# Patient Record
Sex: FEMALE | Race: WHITE
[De-identification: names, ages, dates, MRNs, and addresses within clinical notes are randomized per-mention and may not be internally consistent; named-entity substitution may affect disease eponyms.]

---

## 2018-02-16 ENCOUNTER — HOSPITAL ENCOUNTER (EMERGENCY)
Dept: HOSPITAL 76 - ED | Age: 33
Discharge: HOME | End: 2018-02-16
Payer: COMMERCIAL

## 2018-02-16 VITALS — DIASTOLIC BLOOD PRESSURE: 68 MMHG | SYSTOLIC BLOOD PRESSURE: 115 MMHG

## 2018-02-16 VITALS — SYSTOLIC BLOOD PRESSURE: 131 MMHG | DIASTOLIC BLOOD PRESSURE: 70 MMHG

## 2018-02-16 DIAGNOSIS — O99.89: Primary | ICD-10-CM

## 2018-02-16 DIAGNOSIS — R33.9: ICD-10-CM

## 2018-02-16 DIAGNOSIS — R33.9: Primary | ICD-10-CM

## 2018-02-16 DIAGNOSIS — Z3A.12: ICD-10-CM

## 2018-02-16 LAB
CLARITY UR REFRACT.AUTO: CLEAR
GLUCOSE UR QL STRIP.AUTO: NEGATIVE MG/DL
KETONES UR QL STRIP.AUTO: NEGATIVE MG/DL
NITRITE UR QL STRIP.AUTO: NEGATIVE
PH UR STRIP.AUTO: 6.5 PH (ref 5–7.5)
PROT UR STRIP.AUTO-MCNC: NEGATIVE MG/DL
RBC # UR STRIP.AUTO: (no result) /UL
RBC # URNS HPF: (no result) /HPF (ref 0–5)
SP GR UR STRIP.AUTO: <=1.005 (ref 1–1.03)
SQUAMOUS URNS QL MICRO: (no result)
UROBILINOGEN UR QL STRIP.AUTO: (no result) E.U./DL
UROBILINOGEN UR STRIP.AUTO-MCNC: NEGATIVE MG/DL

## 2018-02-16 PROCEDURE — 99283 EMERGENCY DEPT VISIT LOW MDM: CPT

## 2018-02-16 PROCEDURE — 51702 INSERT TEMP BLADDER CATH: CPT

## 2018-02-16 PROCEDURE — 81003 URINALYSIS AUTO W/O SCOPE: CPT

## 2018-02-16 PROCEDURE — 51701 INSERT BLADDER CATHETER: CPT

## 2018-02-16 PROCEDURE — 81001 URINALYSIS AUTO W/SCOPE: CPT

## 2018-02-16 PROCEDURE — 87086 URINE CULTURE/COLONY COUNT: CPT

## 2018-02-16 NOTE — ED PHYSICIAN DOCUMENTATION
PD HPI FEMALE 





- Stated complaint


Stated Complaint: FEMALE /12 WKS PREG





- Chief complaint


Chief Complaint: Abd Pain





- History obtained from


History obtained from: Patient





- History of Present Illness


Timing - onset: Today (several hours)


Timing - duration: Hours


Timing - details: Gradual onset


Pain level max: 10


Pain level max: 10


Associated symptoms: Back pain, Vaginal pain, Dysuria, Urinary frequency


Contributing factors: Pregnant (12 weeks pregnant; had US at 10 weeks 5 days 

which showed normal IUP)


OB-GYN History: G (3), P (2)


Recently seen: Not recently seen





Review of Systems


Constitutional: denies: Fever, Chills, Sweats


GI: denies: Abdominal Pain, Nausea, Vomiting


: reports: Dysuria, Frequency, Now pregnant EGA (12 weeks)





PD PAST MEDICAL HISTORY





- Past Medical History


Past Medical History: No





- Past Surgical History


Past Surgical History: No





- Allergies


Allergies/Adverse Reactions: 


 Allergies











Allergy/AdvReac Type Severity Reaction Status Date / Time


 


No Known Drug Allergies Allergy   Verified 02/16/18 02:05














- Social History


Does the pt smoke?: No


Smoking Status: Never smoker


Does the pt drink ETOH?: No


Does the pt have substance abuse?: No





- Immunizations


Immunizations are current?: Yes





PD ED PE NORMAL





- Vitals


Vital signs reviewed: Yes





- General


General: Alert and oriented X 3, Well developed/nourished, Other (appears to be 

in painful distress)





- Abdomen


Abdomen: Soft, Other (suprapubic tenderness and distention)





- Back


Back: No CVA TTP





- Derm


Derm: Normal color, Warm and dry





Results





- Vitals


Vitals: 


 Vital Signs - 24 hr











  02/16/18 02/16/18





  02:05 04:00


 


Temperature 36.5 C 


 


Heart Rate 114 H 65


 


Respiratory 20 16





Rate  


 


Blood Pressure 119/66 115/68


 


O2 Saturation 100 95








 Oxygen











O2 Source                      Room air

















- Labs


Labs: 


 Laboratory Tests











  02/16/18





  02:55


 


Urine Color  YELLOW


 


Urine Clarity  CLEAR


 


Urine pH  6.5


 


Ur Specific Gravity  <=1.005


 


Urine Protein  NEGATIVE


 


Urine Glucose (UA)  NEGATIVE


 


Urine Ketones  NEGATIVE


 


Urine Occult Blood  SMALL H


 


Urine Nitrite  NEGATIVE


 


Urine Bilirubin  NEGATIVE


 


Urine Urobilinogen  0.2 (NORMAL)


 


Ur Leukocyte Esterase  NEGATIVE


 


Urine RBC  0-5


 


Urine WBC  0-3


 


Ur Squamous Epith Cells  MOD Squamous H


 


Urine Bacteria  Rare


 


Ur Microscopic Review  INDICATED


 


Urine Culture Comments  NOT INDICATED














PD MEDICAL DECISION MAKING





- ED course


Complexity details: reviewed results, re-evaluated patient, considered 

differential, d/w patient


ED course: 





Patient was able to void only very small amounts and thus RN did bedside 

bladder scan which revealed very full bladder, placed catheter and 1900 cc 

urine output with resolution of symptoms and abdominal distension. No h/o 

urinary retention, denies taking any medications including OTC medications. 





Departure





- Departure


Disposition: 01 Home, Self Care


Clinical Impression: 


 Acute urinary retention





Condition: Good


Instructions:  ED Retention Urinary Female


Follow-Up: 


[facility] [place] [Provider Group] (Call this morning to arrange for next 

available appointment. If possible, you should be seen by the end of the day 

today.)


Discharge Date/Time: 02/16/18 04:00

## 2018-02-16 NOTE — ED PHYSICIAN DOCUMENTATION
PD HPI FEMALE 





- Stated complaint


Stated Complaint: FEMALE 





- Chief complaint


Chief Complaint: Abd Pain





- History obtained from


History obtained from: Patient





- History of Present Illness


Pain level max: 10


Pain level max: 8


Associated symptoms: Other (unable to void, suprapubic pressure)


Contributing factors: Pregnant


OB-GYN History: G (3), P (2)


Similar symptoms before: Has not had sx before


Recently seen: Emergency Dept (d/c from this ED approximately 1 hour ago)





- Additional information


Additional information: 





patient was discharged from this ED approximately 1 hour ago after treated for 

urinary retention. She had been drinking copious amount of liquids when she 

first arrived, as UTI was initially expected. A catheter was eventually placed 

and she had complete symptom resolution after 1900cc urine output and thus 

catheter was removed. UA was not c/w UTI. She returns due to recurrence of 

urinary retention, with urge to urinate but unable to urinate. Increasing 

suprapubic pressure and pain. She is 12 weeks pregnant.





Review of Systems


Constitutional: denies: Fever, Chills, Sweats


GI: reports: Abdominal Pain (suprapubic pain).  denies: Nausea, Vomiting


: reports: Unable to Void





PD PAST MEDICAL HISTORY





- Past Medical History


Past Medical History: No





- Past Surgical History


Past Surgical History: No





- Allergies


Allergies/Adverse Reactions: 


 Allergies











Allergy/AdvReac Type Severity Reaction Status Date / Time


 


No Known Drug Allergies Allergy   Verified 02/16/18 02:05














- Social History


Does the pt smoke?: No


Smoking Status: Never smoker


Does the pt drink ETOH?: No


Does the pt have substance abuse?: No





- Immunizations


Immunizations are current?: Yes





PD ED PE NORMAL





- Vitals


Vital signs reviewed: Yes





- General


General: Alert and oriented X 3, Well developed/nourished, Other (appears to be 

uncomfortable)





- Abdomen


Abdomen: Soft, Non tender, Non distended





- Back


Back: No CVA TTP





Results





- Vitals


Vitals: 


 Vital Signs - 24 hr











  02/16/18





  05:04


 


Heart Rate 90


 


Respiratory 18





Rate 


 


Blood Pressure 131/70 H


 


O2 Saturation 100








 Oxygen











O2 Source                      Room air

















PD MEDICAL DECISION MAKING





- ED course


Complexity details: re-evaluated patient, considered differential, d/w patient


ED course: 





ardon catheter placed and over 1700 cc of clear yellow urine returned with 

complete resolution of symptoms (the urine is orange colored due to pyridium 

that was given on previous visit). I discussed the case with Dr. Cross (ob/gyn 

at [facility]), agrees with d/c with catheter in place and she can be evaluated at [facility] 

later today





Departure





- Departure


Disposition: 01 Home, Self Care


Clinical Impression: 


 Acute urinary retention





Condition: Good


Instructions:  ED Catheter Care Gricel, ED Retention Urinary Female


Follow-Up: 


[facility] [place] [Provider Group]


Comments: 


Contact the [facility] ob/gyn clinic at [phone]. Call after 8 AM. I spoke with 

Dr. Cross who says they will be able to evaluated you today at the clinic and 

give further testing and treatment recommendations based on their exam findings.


Discharge Date/Time: 02/16/18 06:32

## 2018-09-13 ENCOUNTER — HOSPITAL ENCOUNTER (INPATIENT)
Dept: HOSPITAL 76 - FBP | Age: 33
LOS: 1 days | Discharge: HOME | End: 2018-09-14
Attending: OBSTETRICS & GYNECOLOGY | Admitting: OBSTETRICS & GYNECOLOGY
Payer: COMMERCIAL

## 2018-09-13 DIAGNOSIS — O48.0: Primary | ICD-10-CM

## 2018-09-13 DIAGNOSIS — Z3A.41: ICD-10-CM

## 2018-09-13 LAB
BASOPHILS NFR BLD AUTO: 0 10^3/UL (ref 0–0.1)
BASOPHILS NFR BLD AUTO: 0.4 %
EOSINOPHIL # BLD AUTO: 0.1 10^3/UL (ref 0–0.7)
EOSINOPHIL NFR BLD AUTO: 1.8 %
ERYTHROCYTE [DISTWIDTH] IN BLOOD BY AUTOMATED COUNT: 14.1 % (ref 12–15)
HGB UR QL STRIP: 14.3 G/DL (ref 12–16)
LYMPHOCYTES # SPEC AUTO: 2.1 10^3/UL (ref 1.5–3.5)
LYMPHOCYTES NFR BLD AUTO: 25.5 %
MCH RBC QN AUTO: 31.7 PG (ref 27–31)
MCHC RBC AUTO-ENTMCNC: 34.6 G/DL (ref 32–36)
MCV RBC AUTO: 91.7 FL (ref 81–99)
MONOCYTES # BLD AUTO: 0.7 10^3/UL (ref 0–1)
MONOCYTES NFR BLD AUTO: 8.4 %
NEUTROPHILS # BLD AUTO: 5.4 10^3/UL (ref 1.5–6.6)
NEUTROPHILS # SNV AUTO: 8.4 X10^3/UL (ref 4.8–10.8)
NEUTROPHILS NFR BLD AUTO: 63.9 %
PDW BLD AUTO: 9.8 FL (ref 7.9–10.8)
PLATELET # BLD: 204 10^3/UL (ref 130–450)
RBC MAR: 4.51 10^6/UL (ref 4.2–5.4)

## 2018-09-13 PROCEDURE — 86900 BLOOD TYPING SEROLOGIC ABO: CPT

## 2018-09-13 PROCEDURE — 99213 OFFICE O/P EST LOW 20 MIN: CPT

## 2018-09-13 PROCEDURE — 85025 COMPLETE CBC W/AUTO DIFF WBC: CPT

## 2018-09-13 PROCEDURE — 86850 RBC ANTIBODY SCREEN: CPT

## 2018-09-13 PROCEDURE — 86901 BLOOD TYPING SEROLOGIC RH(D): CPT

## 2018-09-13 RX ADMIN — ACETAMINOPHEN PRN MG: 500 TABLET ORAL at 05:51

## 2018-09-13 RX ADMIN — DOCUSATE SODIUM SCH MG: 100 CAPSULE, LIQUID FILLED ORAL at 12:14

## 2018-09-13 RX ADMIN — IBUPROFEN PRN MG: 600 TABLET, FILM COATED ORAL at 12:13

## 2018-09-13 RX ADMIN — ACETAMINOPHEN PRN MG: 500 TABLET ORAL at 17:22

## 2018-09-13 RX ADMIN — IBUPROFEN PRN MG: 600 TABLET, FILM COATED ORAL at 05:52

## 2018-09-13 RX ADMIN — IBUPROFEN PRN MG: 600 TABLET, FILM COATED ORAL at 18:52

## 2018-09-13 RX ADMIN — DOCUSATE SODIUM SCH MG: 100 CAPSULE, LIQUID FILLED ORAL at 20:50

## 2018-09-13 NOTE — HISTORY & PHYSICAL EXAMINATION
DATE OF SERVICE: 09/13/2018

Physician: Em Abad MD

 

CHIEF COMPLAINT:  Labor.

 

HISTORY OF PRESENT ILLNESS:  Contractions started today and they are strong.  
There was no bleeding at home.  The patient states that she broke her bag of 
boyd at 0220, it was clear.  She has been feeling good movement.

 

REVIEW OF SYSTEMS:  Not feeling ill recently, no fevers.

 

PAST MEDICAL HISTORY:  Nephrolithiasis.

 

PAST SURGICAL HISTORY:  Negative.

 

ALLERGIES:  NO KNOWN DRUG ALLERGIES.

 

MEDICATIONS:  Prenatal vitamins daily.

 

SOCIAL HISTORY:  No tobacco, alcohol or drug use.  The patient is a spouse of an
active duty [employer] .

 

FAMILY HISTORY:  The patient's grandmother had a stillbirth.

 

OBSTETRICAL HISTORY:  The patient is G6, P2-0-3-2.  Her first 3 pregnancies were
miscarriages.  Her following 2 pregnancies were normal deliveries at term of AGA
babies.  This is her sixth pregnancy and it has been uncomplicated.  Review of 
[employer] records reveals blood type A positive, antibody screen negative.  Genetic 
screening normal.  First trimester hematocrit 34.3, platelets 217, 1-hour 
glucose 86.  Rubella immune.  Negative gonorrhea, chlamydia, RPR, HIV, hepatitis
B, hepatitis A, hepatitis C, group B strep.

 

The patient has had multiple visits at the [facility].  Her due date is 08/31/2018.  I
do not see ultrasound results and I am not sure what her dating criteria are 
based on.

 

OBJECTIVE:  The patient is afebrile with normal vital signs.  She is comfortable
between contractions.  During contractions she is wanting to bear down.  Abdomen
is nontender and gravid.  Sterile vaginal examination is 8 cm dilated, 90% 
effaced and -1 station.  The fetus is cephalic.  Initial heart tones were 
difficult to trace and appeared to be in the 70s to 90s.  I placed a fetal scalp
electrode, following verbal consent.  Fetal heart tones were then noted in the 
one-teens to low 120s, with moderate variability.  At this point, it is 
difficult to ascertain what her true baseline is or if there are accelerations 
or decelerations present.  We don't have enough tracing available to interpret 
that.  The patient is jarvis about every 3 minutes by observation.

 

IMPRESSION AND PLAN:  A 33-year-old G6, P2 at 41 weeks, 6 days per [employer] dating, 
with active spontaneous labor at term.  She has had an uncomplicated pregnancy. 
She is group B strep negative, vertex, with a moderate long-term variability 
present on the fetal heart tracing.  We will anticipate spontaneous vaginal 
delivery and this multipara with a history of precipitous vaginal delivery in 
the past.  We will watch fetal heart rate tracing closely.

 

 

DD: 09/13/2018 06:30

TD: 09/13/2018 06:39

Job #: [id number]

MTDD

## 2018-09-13 NOTE — PROCEDURE REPORT
DATE OF SERVICE: 2018

Physician: Em Abad MD

 

PROCEDURE:  Spontaneous vaginal delivery at term.

 

PREDELIVERY DIAGNOSES

1.  Intrauterine pregnancy at 41 weeks, 6 days.

2.  Active spontaneous labor.

3.  Spontaneous rupture of membranes for clear fluid.

 

POSTPROCEDURE DIAGNOSES:  Status post normal spontaneous vaginal delivery.

 

PHYSICIAN:  Em Abad MD.

 

ESTIMATED BLOOD LOSS:  200 mL.

 

ANESTHESIA:  None.

 

COUNTS:  Correct x2.

 

COMPLICATIONS:  None apparent.

 

CONDITION:  Good.

 

FINDINGS

1.  Liveborn male, Apgars 8 at one minute and 9 at 5 minutes.  He appears AGA.

2.  No lacerations.

 

ANTEPARTUM COURSE:  Patient is a healthy 33-year-old G6, P2, who is a [employer] patient.  She received all
 of her care there.  She did not have any problems with the pregnancy.

 

LABOR COURSE:  Patient ruptured for clear fluid at home and stayed at home until her contractions bec
hannah more intense.  She delivered within about 15 minutes of admission to the hospital.

 

DELIVERY:  Patient was on hands and knees and had an uncontrollable urge to bear down.  She began cassandra
ckly  at this point.  The head and shoulders were easily delivered, and the infant was placed
 on a bed so that mom could lie down and be skin-to-skin with her infant.  There was no nuchal cord. 
 The umbilical cord was left pulsating until it stopped at about the 2-minute srikanth.  The umbilical co
rd was clamped x2 by the MD and cut by the father of the baby.  Cord blood was obtained for typing.  
The cord was then drained.  Pitocin was started for active management of the placenta.  The placenta 
was delivered about 4 minutes later intact with a 3-vessel cord following a maternal push.  Inspectio
n revealed no lacerations.  Fundal massage yielded a uterus that was firm, 3 cm below the umbilicus, 
and displaced to the left.

 

POSTPARTUM ISSUES:  None identified in this healthy multipara.  She is Rh positive, rubella immune, a
nd has received her Tdap vaccine.

 

 

DD: 2018 06:36

TD: 2018 06:43

Job #: [id number]

## 2018-09-14 VITALS — DIASTOLIC BLOOD PRESSURE: 62 MMHG | SYSTOLIC BLOOD PRESSURE: 114 MMHG

## 2018-09-14 RX ADMIN — DOCUSATE SODIUM SCH MG: 100 CAPSULE, LIQUID FILLED ORAL at 08:10

## 2018-09-14 RX ADMIN — IBUPROFEN PRN MG: 600 TABLET, FILM COATED ORAL at 01:53

## 2018-09-14 RX ADMIN — IBUPROFEN PRN MG: 600 TABLET, FILM COATED ORAL at 08:09

## 2018-09-14 RX ADMIN — ACETAMINOPHEN PRN MG: 500 TABLET ORAL at 01:53

## 2018-09-14 RX ADMIN — ACETAMINOPHEN PRN MG: 500 TABLET ORAL at 10:05

## 2018-09-14 NOTE — LABOR FLOWSHEET
===================================

Labor Flowsheet

===================================

Datetime Report Generated by CPN: 09/14/2018 14:17

   

   

===========================

Datetime: 09/14/2018 09:07

===========================

   

   

===================================

VITAL SIGNS

===================================

   

 NBP Sys/Franca/Mean (mmHg):  115

:  63

:  74

 Pulse:  69

   

===================================

COMMUNICATION

===================================

   

 LaborFlag:  Labor

   

===========================

Datetime: 09/13/2018 08:24

===========================

   

 SpO2 (%):  98

   

===========================

Datetime: 09/13/2018 05:12

===========================

   

   

===================================

UTERINE ACTIVITY

===================================

   

 Monitor Mode:  External

 Frequency (min):  2-3

 Duration (sec):  60-90

 Pattern:  Normal: <= 5 Contractions in 10 Minutes

 Resting Tone (Palpate):  Relaxed

   

===================================

FETAL ASSESSMENT A

===================================

   

 Monitor Mode:  External US

 FHR Baseline Rate :  120

 Variability:  Moderate 6-25 bpm

 Accelerations:  15X15

 Decelerations:  Early; Variable

 Category:  Category II

   

===========================

Datetime: 09/13/2018 05:02

===========================

   

   

===================================

PATIENT CARE

===================================

   

 Patient Position/Activity:  Hands-Knees

   

===========================

Datetime: 09/13/2018 05:00

===========================

   

 Temperature (C):  36.4

   

===================================

PAIN

===================================

   

 Pain Scale:  10

 Pain Presence:  Intermittent

 Pain Type:  Contraction

   

===================================

TEACHING

===================================

   

 Instructional Method:  Verbal

 Plan of Care:  Plan of Care Discussed; Labor

 Unit Routine:  Unit Personnel; Fetal Monitoring

   

===========================

Datetime: 09/13/2018 04:57

===========================

   

 Monitor Interventions for FHR:  FSE Applied

   

===========================

Datetime: 09/13/2018 04:56

===========================

   

   

===================================

VAGINAL EXAM

===================================

   

 Dilatation (cm):  8.0

 Effacement (%):  90

 Station:  -1

 Exam by:  Dr. Abad

   

===========================

Datetime: 09/13/2018 04:50

===========================

   

Stage of Pregnancy:  Labor

## 2018-09-14 NOTE — DISCHARGE PLAN
Discharge Plan


Disposition: 01 Home, Self Care


Condition: Good


Prescriptions: 


Docusate Sodium 100 mg PO BID PRN #60 capsule


 PRN Reason: Constipation


Ibuprofen [Motrin] 600 mg PO Q6H PRN #30 tablet


 PRN Reason: Abdominal Pain


Diet: Regular


Activity Restrictions: No Restrictions


Shower Restrictions: No


Driving Restrictions: No


Additional Instructions or Follow Up instructions: 


See labor and delivery handout


No Smoking: If you smoke, Please STOP!  Call [phone] for help.

## 2018-09-14 NOTE — DISCHARGE SUMMARY
Physician: Em Abad MD

DATE OF ADMISSION: 09/13/2018

DATE OF DISCHARGE:  09/14/2018

 

ADMISSION DIAGNOSES

1.  Intrauterine pregnancy at term.

2.  Spontaneous labor.

 

DISCHARGE DIAGNOSIS:  Status post spontaneous vaginal delivery at term, uncomplicated.

 

OPERATIONS AND PROCEDURES:  09/13/2018, spontaneous vaginal delivery at term.

 

HOSPITAL COURSE:  The patient is a 33-year-old, G6, now P3, who presented in active spontaneous labor
 and delivered 15 minutes later.  Her postpartum course was uncomplicated.  By postpartum day 1, she 
was requesting discharge home.  She was eating, ambulating, urinating, and breastfeeding without diff
iculties.  She did not have any significant pain or lewd problems. 

 

DISCHARGE EXAMINATION:  Afebrile with normal vital signs.  Alert, pleasant, in no apparent distress. 
 Smiling and breastfeeding currently.  Abdomen soft, nontender, nondistended.  Fundus firm, nontender
, and 1 cm below the umbilicus.  No postpartum labs were done.

 

DISCHARGE DISPOSITION:  Home.

 

CONDITION:  Good.

 

FOLLOWUP:  In 2 weeks with primary OB team.

 

MEDICATIONS:  

1.  Continue prenatal vitamins.

2.  Ibuprofen p.r.n. pain.

3.  Colace p.r.n. to soften stool.

 

DISCHARGE INSTRUCTIONS:  Routine postpartum instructions given by the labor and delivery nurses.

 

 

DD: 09/14/2018 12:37

TD: 09/14/2018 12:43

Job #: [id number]